# Patient Record
Sex: MALE | Race: WHITE | NOT HISPANIC OR LATINO | Employment: FULL TIME | URBAN - METROPOLITAN AREA
[De-identification: names, ages, dates, MRNs, and addresses within clinical notes are randomized per-mention and may not be internally consistent; named-entity substitution may affect disease eponyms.]

---

## 2022-01-13 ENCOUNTER — OFFICE VISIT (OUTPATIENT)
Dept: FAMILY MEDICINE CLINIC | Facility: CLINIC | Age: 27
End: 2022-01-13

## 2022-01-13 ENCOUNTER — LAB (OUTPATIENT)
Dept: LAB | Facility: HOSPITAL | Age: 27
End: 2022-01-13

## 2022-01-13 VITALS
TEMPERATURE: 98.6 F | SYSTOLIC BLOOD PRESSURE: 116 MMHG | HEIGHT: 69 IN | OXYGEN SATURATION: 96 % | WEIGHT: 142.6 LBS | RESPIRATION RATE: 18 BRPM | HEART RATE: 82 BPM | BODY MASS INDEX: 21.12 KG/M2 | DIASTOLIC BLOOD PRESSURE: 68 MMHG

## 2022-01-13 DIAGNOSIS — F41.9 ANXIETY: ICD-10-CM

## 2022-01-13 DIAGNOSIS — Z00.00 ANNUAL PHYSICAL EXAM: Primary | ICD-10-CM

## 2022-01-13 DIAGNOSIS — Z23 IMMUNIZATION DUE: ICD-10-CM

## 2022-01-13 LAB
BASOPHILS # BLD AUTO: 0.06 10*3/MM3 (ref 0–0.2)
BASOPHILS NFR BLD AUTO: 0.7 % (ref 0–1.5)
DEPRECATED RDW RBC AUTO: 39.9 FL (ref 37–54)
EOSINOPHIL # BLD AUTO: 0.02 10*3/MM3 (ref 0–0.4)
EOSINOPHIL NFR BLD AUTO: 0.2 % (ref 0.3–6.2)
ERYTHROCYTE [DISTWIDTH] IN BLOOD BY AUTOMATED COUNT: 12.4 % (ref 12.3–15.4)
HCT VFR BLD AUTO: 49.4 % (ref 37.5–51)
HGB BLD-MCNC: 16.6 G/DL (ref 13–17.7)
IMM GRANULOCYTES # BLD AUTO: 0.03 10*3/MM3 (ref 0–0.05)
IMM GRANULOCYTES NFR BLD AUTO: 0.4 % (ref 0–0.5)
LYMPHOCYTES # BLD AUTO: 1.72 10*3/MM3 (ref 0.7–3.1)
LYMPHOCYTES NFR BLD AUTO: 20.9 % (ref 19.6–45.3)
MCH RBC QN AUTO: 29.7 PG (ref 26.6–33)
MCHC RBC AUTO-ENTMCNC: 33.6 G/DL (ref 31.5–35.7)
MCV RBC AUTO: 88.5 FL (ref 79–97)
MONOCYTES # BLD AUTO: 0.56 10*3/MM3 (ref 0.1–0.9)
MONOCYTES NFR BLD AUTO: 6.8 % (ref 5–12)
NEUTROPHILS NFR BLD AUTO: 5.84 10*3/MM3 (ref 1.7–7)
NEUTROPHILS NFR BLD AUTO: 71 % (ref 42.7–76)
NRBC BLD AUTO-RTO: 0 /100 WBC (ref 0–0.2)
PLATELET # BLD AUTO: 263 10*3/MM3 (ref 140–450)
PMV BLD AUTO: 11.2 FL (ref 6–12)
RBC # BLD AUTO: 5.58 10*6/MM3 (ref 4.14–5.8)
WBC NRBC COR # BLD: 8.23 10*3/MM3 (ref 3.4–10.8)

## 2022-01-13 PROCEDURE — 99395 PREV VISIT EST AGE 18-39: CPT | Performed by: FAMILY MEDICINE

## 2022-01-13 PROCEDURE — 90686 IIV4 VACC NO PRSV 0.5 ML IM: CPT | Performed by: FAMILY MEDICINE

## 2022-01-13 PROCEDURE — 99203 OFFICE O/P NEW LOW 30 MIN: CPT | Performed by: FAMILY MEDICINE

## 2022-01-13 PROCEDURE — 90471 IMMUNIZATION ADMIN: CPT | Performed by: FAMILY MEDICINE

## 2022-01-13 RX ORDER — SERTRALINE HYDROCHLORIDE 25 MG/1
25 TABLET, FILM COATED ORAL DAILY
Qty: 90 TABLET | Refills: 0 | Status: SHIPPED | OUTPATIENT
Start: 2022-01-13 | End: 2022-01-14 | Stop reason: ALTCHOICE

## 2022-01-13 NOTE — PROGRESS NOTES
New Patient Office Visit      Patient Name: Ilan Kearney  : 1995   MRN: [id number]     Chief Complaint:    Chief Complaint   Patient presents with   • Annual Exam   • Anxiety   • Establish Care       History of Present Illness: Ilan Kearney is a 26 y.o. male who is here today to establish care.  Patient has new onset depression and anxiety.  Patient lives in [place] but is here seasonally.    Depression anxiety-new onset  He lives in [place] and comes in seasonally. He reports symptoms started mid December. He had more obessessions about his hair line which is abnormal. He says he has nausea every morning and increased anxiety can make him vomit. He living off of ensure bc he has no appetite. He had a good meal yesterday. He feels unsure but isn't sure why.     Patient also would like to get his annual exam performed.  Patient is requesting blood work since he has new onset depression anxiety.  We also discussed vaccines.  We discussed diet and exercise as well.      Review of systems was positive for depression and anxiety      Physical exam: Patient's mood is appropriate patient's affect is anxious.  Patient's heart and lung exam was normal without rales rhonchi's or murmurs.  Patient did not have any lower remedy edema.  Patient did not have any joint swelling.  Patient spinal exam did not show any tenderness.  Patient had good posture.  Patient had appropriate normal muscle tone.  Patient was in no acute distress.    Subjective          Past Medical History: History reviewed. No pertinent past medical history.    Past Surgical History:   Past Surgical History:   Procedure Laterality Date   • APPENDECTOMY  2020       Family History:   Family History   Adopted: Yes       Social History:   Social History     Socioeconomic History   • Marital status: Single   Tobacco Use   • Smoking status: Never Smoker   • Smokeless tobacco: Never Used   Substance and Sexual Activity   • Alcohol use: Never  "  • Drug use: Never       Medications:     Current Outpatient Medications:   •  sertraline (Zoloft) 25 MG tablet, Take 1 tablet by mouth Daily., Disp: 90 tablet, Rfl: 0    Allergies:   No Known Allergies    Objective     Physical Exam: Please see above  Vital Signs:   Vitals:    01/13/22 1403   BP: 116/68   Pulse: 82   Resp: 18   Temp: 98.6 °F (37 °C)   TempSrc: Temporal   SpO2: 96%   Weight: 64.7 kg (142 lb 9.6 oz)   Height: 175.3 cm (69\")   PainSc: 0-No pain     Body mass index is 21.06 kg/m².       Assessment / Plan      Assessment/Plan:   Diagnoses and all orders for this visit:    1. Annual physical exam (Primary)  -     CBC & Differential; Future  -     Comprehensive Metabolic Panel; Future  -     Hepatitis C Antibody; Future  -     HIV-1 / O / 2 Ag / Antibody 4th Generation; Future  -     Lipid Panel; Future  -     TSH Rfx On Abnormal To Free T4; Future  -     Vitamin B12; Future    2. Immunization due  -     FluLaval/Fluarix/Fluzone >6 Months (0609-2749)    3. Anxiety  -     Ambulatory Referral to Behavioral Health  -     sertraline (Zoloft) 25 MG tablet; Take 1 tablet by mouth Daily.  Dispense: 90 tablet; Refill: 0         1. Annual counseling: Counseled patient regards to vaccines and he agreed to flu shot.  Counseled patient regards to the screening labs and he is okay with that.  Also discussed diet and exercise.    Recommend trying Zoloft 25 mg for his anxiety.  If it causes side effects he can stop it and call me.  I would consider Effexor and mirtazapine as alternatives.  Could also try Lexapro or Celexa.  Referring to behavioral health for counseling.        Patient should follow-up in 6 to 8 weeks -repeat LILIAN-7 and PHQ-9 at that time      Follow Up:   Return in about 7 weeks (around 3/3/2022).    Ever Delgado,   [facility] [place]     "

## 2022-01-14 ENCOUNTER — TELEPHONE (OUTPATIENT)
Dept: FAMILY MEDICINE CLINIC | Facility: CLINIC | Age: 27
End: 2022-01-14

## 2022-01-14 LAB
ALBUMIN SERPL-MCNC: 5 G/DL (ref 3.5–5.2)
ALBUMIN/GLOB SERPL: 1.5 G/DL
ALP SERPL-CCNC: 51 U/L (ref 39–117)
ALT SERPL W P-5'-P-CCNC: 12 U/L (ref 1–41)
ANION GAP SERPL CALCULATED.3IONS-SCNC: 6.8 MMOL/L (ref 5–15)
AST SERPL-CCNC: 13 U/L (ref 1–40)
BILIRUB SERPL-MCNC: 1.9 MG/DL (ref 0–1.2)
BUN SERPL-MCNC: 10 MG/DL (ref 6–20)
BUN/CREAT SERPL: 13.2 (ref 7–25)
CALCIUM SPEC-SCNC: 9.9 MG/DL (ref 8.6–10.5)
CHLORIDE SERPL-SCNC: 100 MMOL/L (ref 98–107)
CHOLEST SERPL-MCNC: 147 MG/DL (ref 0–200)
CO2 SERPL-SCNC: 30.2 MMOL/L (ref 22–29)
CREAT SERPL-MCNC: 0.76 MG/DL (ref 0.76–1.27)
GFR SERPL CREATININE-BSD FRML MDRD: 124 ML/MIN/1.73
GLOBULIN UR ELPH-MCNC: 3.3 GM/DL
GLUCOSE SERPL-MCNC: 118 MG/DL (ref 65–99)
HCV AB SER DONR QL: NORMAL
HDLC SERPL-MCNC: 44 MG/DL (ref 40–60)
HIV1+2 AB SER QL: NORMAL
LDLC SERPL CALC-MCNC: 88 MG/DL (ref 0–100)
LDLC/HDLC SERPL: 1.99 {RATIO}
POTASSIUM SERPL-SCNC: 4 MMOL/L (ref 3.5–5.2)
PROT SERPL-MCNC: 8.3 G/DL (ref 6–8.5)
SODIUM SERPL-SCNC: 137 MMOL/L (ref 136–145)
TRIGL SERPL-MCNC: 78 MG/DL (ref 0–150)
TSH SERPL DL<=0.05 MIU/L-ACNC: 2.33 UIU/ML (ref 0.27–4.2)
VIT B12 BLD-MCNC: 695 PG/ML (ref 211–946)
VLDLC SERPL-MCNC: 15 MG/DL (ref 5–40)

## 2022-01-14 RX ORDER — VENLAFAXINE HYDROCHLORIDE 37.5 MG/1
37.5 CAPSULE, EXTENDED RELEASE ORAL DAILY
Qty: 30 CAPSULE | Refills: 0 | Status: SHIPPED | OUTPATIENT
Start: 2022-01-14

## 2022-01-14 NOTE — TELEPHONE ENCOUNTER
Caller: Ilan Kearney    Relationship: Self    Best call back number    [phone]    What medications are you currently taking:   Current Outpatient Medications on File Prior to Visit   Medication Sig Dispense Refill   • sertraline (Zoloft) 25 MG tablet Take 1 tablet by mouth Daily. 90 tablet 0     No current facility-administered medications on file prior to visit.      When did you start taking these medications:     LAST NIGHT    Which medication are you concerned about:     SEE ABOVE    Who prescribed you this medication:     DR HERNANDEZ    What are your concerns:     PATIENT STATED HE HAD A SEVERE REACTION TO TAKING THE ZOLOFT    PATIENT EXPERIENCED DIARRHEA, VOMITING, AND LITTLE SLEEP AS A REACTION    How long have you had these concerns:     N/A    PLEASE CONTACT PATIENT WITH ADVISE FOR NEXT STEPS     DR HERNANDEZ